# Patient Record
Sex: FEMALE | Race: BLACK OR AFRICAN AMERICAN | NOT HISPANIC OR LATINO | Employment: PART TIME | URBAN - METROPOLITAN AREA
[De-identification: names, ages, dates, MRNs, and addresses within clinical notes are randomized per-mention and may not be internally consistent; named-entity substitution may affect disease eponyms.]

---

## 2019-09-13 ENCOUNTER — OFFICE VISIT (OUTPATIENT)
Dept: ENDOCRINOLOGY | Facility: CLINIC | Age: 19
End: 2019-09-13
Payer: MEDICAID

## 2019-09-13 VITALS
HEART RATE: 82 BPM | TEMPERATURE: 100 F | SYSTOLIC BLOOD PRESSURE: 116 MMHG | HEIGHT: 66 IN | DIASTOLIC BLOOD PRESSURE: 83 MMHG | WEIGHT: 222 LBS | BODY MASS INDEX: 35.68 KG/M2

## 2019-09-13 DIAGNOSIS — E11.65 TYPE 2 DIABETES MELLITUS WITH HYPERGLYCEMIA, WITHOUT LONG-TERM CURRENT USE OF INSULIN: Primary | ICD-10-CM

## 2019-09-13 DIAGNOSIS — L83 ACANTHOSIS NIGRICANS: ICD-10-CM

## 2019-09-13 DIAGNOSIS — E66.9 OBESITY (BMI 30-39.9): ICD-10-CM

## 2019-09-13 PROCEDURE — 99999 PR PBB SHADOW E&M-NEW PATIENT-LVL III: ICD-10-PCS | Mod: PBBFAC,,, | Performed by: INTERNAL MEDICINE

## 2019-09-13 PROCEDURE — 99204 OFFICE O/P NEW MOD 45 MIN: CPT | Mod: S$PBB,,, | Performed by: INTERNAL MEDICINE

## 2019-09-13 PROCEDURE — 99999 PR PBB SHADOW E&M-NEW PATIENT-LVL III: CPT | Mod: PBBFAC,,, | Performed by: INTERNAL MEDICINE

## 2019-09-13 PROCEDURE — 99203 OFFICE O/P NEW LOW 30 MIN: CPT | Mod: PBBFAC | Performed by: INTERNAL MEDICINE

## 2019-09-13 PROCEDURE — 99204 PR OFFICE/OUTPT VISIT, NEW, LEVL IV, 45-59 MIN: ICD-10-PCS | Mod: S$PBB,,, | Performed by: INTERNAL MEDICINE

## 2019-09-13 RX ORDER — METFORMIN HYDROCHLORIDE 750 MG/1
750 TABLET, EXTENDED RELEASE ORAL
COMMUNITY
Start: 2019-01-23 | End: 2019-09-13

## 2019-09-13 RX ORDER — PEN NEEDLE, DIABETIC 30 GX3/16"
NEEDLE, DISPOSABLE MISCELLANEOUS
Qty: 150 EACH | Refills: 2 | Status: SHIPPED | OUTPATIENT
Start: 2019-09-13 | End: 2019-12-30 | Stop reason: SDUPTHER

## 2019-09-13 RX ORDER — LORATADINE 10 MG/1
TABLET ORAL
Refills: 0 | COMMUNITY
Start: 2019-06-06 | End: 2020-06-02

## 2019-09-13 RX ORDER — PREDNISONE 10 MG/1
10 TABLET ORAL 2 TIMES DAILY
Refills: 0 | COMMUNITY
Start: 2019-06-06 | End: 2020-06-02

## 2019-09-13 RX ORDER — INSULIN LISPRO 100 [IU]/ML
INJECTION, SOLUTION INTRAVENOUS; SUBCUTANEOUS
Qty: 5 SYRINGE | Refills: 1 | Status: SHIPPED | OUTPATIENT
Start: 2019-09-13 | End: 2019-09-24 | Stop reason: SDUPTHER

## 2019-09-13 RX ORDER — INSULIN PUMP SYRINGE, 3 ML
EACH MISCELLANEOUS
Qty: 1 EACH | Refills: 0 | Status: SHIPPED | OUTPATIENT
Start: 2019-09-13 | End: 2019-09-23

## 2019-09-13 NOTE — PATIENT INSTRUCTIONS
Visit an eye Dr. ( A referral is placed)    Levemir: long acting insulin  Six units daily    Humalog:Fast acting Insulin:  One unit for each 15 grams of carbohydrate with each meal.  Average of 3-4 units with the meal.    Check your blood sugar before each meal and at bedtime.  If your blood sugar is less than 105 at night, eat a snack.    Send your blood sugar log in couple days.    Keep glucose tablets with you.      -----------------------------------------------------  For Levemir:    Pregnancy Implications  Insulin detemir can be detected in cord blood.  An increased risk of fetal abnormalities has not been observed following the use of insulin detemir in pregnant females with type 1 diabetes mellitus.  Poorly controlled diabetes during pregnancy can be associated with an increased risk of adverse maternal and fetal outcomes, including diabetic ketoacidosis, preeclampsia, spontaneous ,  delivery, delivery complications, major birth defects, stillbirth, and macrosomia (ACOG 201 2018). To prevent adverse outcomes, prior to conception and throughout pregnancy, maternal blood glucose and HbA1c should be kept as close to target goals as possible but without causing significant hypoglycemia (ADA 2019; Isai 2013).

## 2019-09-13 NOTE — PROGRESS NOTES
PCP:  NONE    SELF REFERRAL    CC:  HIGH BLOOD SUGAR    HPI:  Lupe Peterson 18 y.o. female  Pt is feeling ok. She has high blood sugar, and she has found out she is pregnant.  She was treated with 5 units of Reg Insulin.   No complaints of cp, sob, vomiting or edema.  3 meals a day  Lunch is the big meal  ------------------------------  insulin regular (HumuLIN R,NovoLIN R) injection 5 Units    5 Units, Subcutaneous, Once, 1 dose, Wed 9/11/19 at 1345  --------------------------------  From [facility]ED ENDOCR[facility], DR. BLANCO'S NOTE IN 2013:  Abstract on 04/18/2013   Component Date Value Range Status    ISLET CELL AUTOANTIBODIES CF 03/28/2012 <1:2 Final    ISLET CELL IGG AUTOANTIBODIES 03/28/2012 <5 Final    ANTI BECKY 65 ANTIBODY 03/28/2012 0.00 Final    25-Hydroxy D Total 03/28/2012 <13 Final    TSH, High Sensitivity 03/28/2012 1.004 Final    Free T4 03/28/2012 0.95 Final    Insulin AutoAb 03/28/2012 0.00 Final    C PEPTIDE S 03/28/2012 1.6 Final    Hemoglobin A1C 03/28/2012 12.5* 5.7 - 6.4 % Final       History reviewed. No pertinent past medical history.    History reviewed. No pertinent surgical history.    Social History     Socioeconomic History    Marital status: Single     Spouse name: Not on file    Number of children: Not on file    Years of education: Not on file    Highest education level: Not on file   Occupational History    Not on file   Social Needs    Financial resource strain: Not on file    Food insecurity:     Worry: Not on file     Inability: Not on file    Transportation needs:     Medical: Not on file     Non-medical: Not on file   Tobacco Use    Smoking status: Former Smoker    Smokeless tobacco: Never Used   Substance and Sexual Activity    Alcohol use: Not on file    Drug use: Not on file    Sexual activity: Not on file   Lifestyle    Physical activity:     Days per week: Not on file     Minutes per session: Not on file    Stress: Not on file   Relationships    Social  "connections:     Talks on phone: Not on file     Gets together: Not on file     Attends Gnosticism service: Not on file     Active member of club or organization: Not on file     Attends meetings of clubs or organizations: Not on file     Relationship status: Not on file   Other Topics Concern    Not on file   Social History Narrative    Not on file         ROS:   Pregnancy  High blood sugar  Diabetes  ROS otherwise neg except for what is mentioned in the PMH, PSH and HPI    PE:  Vitals:    09/13/19 1030   BP: 116/83   Pulse: 82   Temp: 99.5 °F (37.5 °C)     Alert and oriented  No acute distress  No acne  Acanthosis  No Proptosis or conjunctivitis  No rash on tongue, + teeth  No goitre by inspection  Thyroid gland is not palpable  No cervical lymphadenopathy  Heart reg, no gallop  Lungs cta, no wheezing  Abd soft, no tnd  No edema in lower legs  No rash  No bruises  Speech normal  Behavior normal  No tremor  + obesity  Body mass index is 35.83 kg/m².      Lab:      A/P:  Type 2 diabetes mellitus with hyperglycemia, without long-term current use of insulin  -     Ambulatory Referral to Ophthalmology    Obesity (BMI 30-39.9)    Acanthosis nigricans    Other orders  -     blood sugar diagnostic Strp; 5-6 times a day  Dispense: 200 strip; Refill: 2  -     blood-glucose meter kit; Use as instructed 5-7 times a day  Dispense: 1 each; Refill: 0  -     insulin detemir U-100 (LEVEMIR FLEXTOUCH U-100 INSULN) 100 unit/mL (3 mL) SubQ InPn pen; 6 units daily  Dispense: 3 mL; Refill: 0  -     insulin lispro (HUMALOG KWIKPEN INSULIN) 100 unit/mL pen; One unit for each 15 grams of carb with each meal; average of 3 units each meal  Dispense: 5 Syringe; Refill: 1  -     pen needle, diabetic 30 gauge x 5/16" Ndle; qid  Dispense: 150 each; Refill: 2       Patient Instructions   Visit an eye Dr. ( A referral is placed)    Levemir: long acting insulin  Six units daily    Humalog:Fast acting Insulin:  One unit for each 15 grams of " carbohydrate with each meal.  Average of 3-4 units with the meal.    Check your blood sugar before each meal and at bedtime.  If your blood sugar is less than 105 at night, eat a snack.    Send your blood sugar log in couple days.    Keep glucose tablets with you.      -----------------------------------------------------  For Levemir:    Pregnancy Implications  Insulin detemir can be detected in cord blood.  An increased risk of fetal abnormalities has not been observed following the use of insulin detemir in pregnant females with type 1 diabetes mellitus.  Poorly controlled diabetes during pregnancy can be associated with an increased risk of adverse maternal and fetal outcomes, including diabetic ketoacidosis, preeclampsia, spontaneous ,  delivery, delivery complications, major birth defects, stillbirth, and macrosomia (ACOG 201 2018). To prevent adverse outcomes, prior to conception and throughout pregnancy, maternal blood glucose and HbA1c should be kept as close to target goals as possible but without causing significant hypoglycemia (ADA 2019; Isai 2013).      Appt in one week.      Pt understands the plan and instructions.

## 2019-09-23 ENCOUNTER — OFFICE VISIT (OUTPATIENT)
Dept: ENDOCRINOLOGY | Facility: CLINIC | Age: 19
End: 2019-09-23
Payer: MEDICAID

## 2019-09-23 VITALS
HEART RATE: 62 BPM | SYSTOLIC BLOOD PRESSURE: 108 MMHG | BODY MASS INDEX: 35.19 KG/M2 | HEIGHT: 66 IN | DIASTOLIC BLOOD PRESSURE: 68 MMHG | WEIGHT: 218.94 LBS | TEMPERATURE: 98 F

## 2019-09-23 DIAGNOSIS — E11.65 TYPE 2 DIABETES MELLITUS WITH HYPERGLYCEMIA, WITHOUT LONG-TERM CURRENT USE OF INSULIN: Primary | ICD-10-CM

## 2019-09-23 DIAGNOSIS — E66.9 OBESITY (BMI 30-39.9): ICD-10-CM

## 2019-09-23 PROCEDURE — 99213 OFFICE O/P EST LOW 20 MIN: CPT | Mod: PBBFAC | Performed by: INTERNAL MEDICINE

## 2019-09-23 PROCEDURE — 99214 OFFICE O/P EST MOD 30 MIN: CPT | Mod: S$PBB,,, | Performed by: INTERNAL MEDICINE

## 2019-09-23 PROCEDURE — 99999 PR PBB SHADOW E&M-EST. PATIENT-LVL III: ICD-10-PCS | Mod: PBBFAC,,, | Performed by: INTERNAL MEDICINE

## 2019-09-23 PROCEDURE — 99999 PR PBB SHADOW E&M-EST. PATIENT-LVL III: CPT | Mod: PBBFAC,,, | Performed by: INTERNAL MEDICINE

## 2019-09-23 PROCEDURE — 99214 PR OFFICE/OUTPT VISIT, EST, LEVL IV, 30-39 MIN: ICD-10-PCS | Mod: S$PBB,,, | Performed by: INTERNAL MEDICINE

## 2019-09-23 RX ORDER — BLOOD-GLUCOSE METER
EACH MISCELLANEOUS
Refills: 0 | COMMUNITY
Start: 2019-09-13

## 2019-09-23 NOTE — PROGRESS NOTES
PCP:  NONE    SELF REFERRAL    CC:  HIGH BLOOD SUGAR    HPI:  Lupe Peterson 18 y.o. female  Patient is accompanied by a young man.  Patient had miscarriage few days ago on 9/17/19  DM at age 11 y  On Metfomin BID  153 this am  Last night 206  Mostly 200s  3 meals a day  Lunch is the big meal  No complaints of fever, chest pain, shortness breath, nausea, vomiting, edema, or rash.  ------------------------------  From [facility]ED ENDOCRINE, DR. BLANCO'S NOTE IN 2013:  Abstract on 04/18/2013   Component Date Value Range Status    ISLET CELL AUTOANTIBODIES CF 03/28/2012 <1:2 Final    ISLET CELL IGG AUTOANTIBODIES 03/28/2012 <5 Final    ANTI BECKY 65 ANTIBODY 03/28/2012 0.00 Final    25-Hydroxy D Total 03/28/2012 <13 Final    TSH, High Sensitivity 03/28/2012 1.004 Final    Free T4 03/28/2012 0.95 Final    Insulin AutoAb 03/28/2012 0.00 Final    C PEPTIDE S 03/28/2012 1.6 Final    Hemoglobin A1C 03/28/2012 12.5* 5.7 - 6.4 % Final       Past Medical History:   Diagnosis Date    Type 2 diabetes mellitus with hyperglycemia, without long-term current use of insulin 9/13/2019       No past surgical history on file.    Social History     Socioeconomic History    Marital status: Single     Spouse name: Not on file    Number of children: Not on file    Years of education: Not on file    Highest education level: Not on file   Occupational History    Not on file   Social Needs    Financial resource strain: Not on file    Food insecurity:     Worry: Not on file     Inability: Not on file    Transportation needs:     Medical: Not on file     Non-medical: Not on file   Tobacco Use    Smoking status: Former Smoker    Smokeless tobacco: Never Used   Substance and Sexual Activity    Alcohol use: Not on file    Drug use: Not on file    Sexual activity: Not on file   Lifestyle    Physical activity:     Days per week: Not on file     Minutes per session: Not on file    Stress: Not on file   Relationships    Social  connections:     Talks on phone: Not on file     Gets together: Not on file     Attends Evangelical service: Not on file     Active member of club or organization: Not on file     Attends meetings of clubs or organizations: Not on file     Relationship status: Not on file   Other Topics Concern    Not on file   Social History Narrative    Not on file         ROS:   Diabetes  ROS otherwise neg except for what is mentioned in the PMH, PSH and HPI    PE:  Vitals:    09/23/19 1501   BP: 108/68   Pulse: 62   Temp: 98.2 °F (36.8 °C)     Alert and oriented  No acute distress  No acne  Acanthosis nigricans  No Proptosis or conjunctivitis  No rash on tongue, + teeth  No goitre by inspection  Thyroid gland is not palpable  No cervical lymphadenopathy  Heart reg, no gallop  Lungs cta, no wheezing  Abd soft, no tnd  No edema in lower legs  No rash  No bruises  Speech normal  Behavior normal  No tremor  + obesity  Body mass index is 35.33 kg/m².      Lab:      A/P:  Type 2 diabetes mellitus with hyperglycemia, without long-term current use of insulin  Status post miscarriage  -     Ambulatory Referral to Ophthalmology    Obesity (BMI 30-39.9)    Acanthosis nigricans      Patient Instructions       Levemir every morning  15 units daily  --------------------------------------------------------------  Humalog:Fast acting Insulin:  12 units at lunch  and 8 units at supper    Check your blood sugar before each meal and at bedtime.  If your blood sugar is less than 105 at night, eat a snack.    Send your blood sugar log in couple days.    Keep glucose tablets with you.      -----------------------------------------------------         Send your blood sugar log in couple days.    Keep glucose tablets with you.      Appt in 6 weeks      Pt understands the plan and instructions.

## 2019-09-23 NOTE — PATIENT INSTRUCTIONS
Levemir every morning  15 units daily  --------------------------------------------------------------  Humalog:Fast acting Insulin:  12 units at lunch  and 8 units at supper    Check your blood sugar before each meal and at bedtime.  If your blood sugar is less than 105 at night, eat a snack.    Send your blood sugar log in couple days.    Keep glucose tablets with you.      -----------------------------------------------------

## 2019-09-24 ENCOUNTER — PATIENT MESSAGE (OUTPATIENT)
Dept: ENDOCRINOLOGY | Facility: CLINIC | Age: 19
End: 2019-09-24

## 2019-09-25 ENCOUNTER — PATIENT MESSAGE (OUTPATIENT)
Dept: ENDOCRINOLOGY | Facility: CLINIC | Age: 19
End: 2019-09-25

## 2019-09-25 RX ORDER — INSULIN LISPRO 100 [IU]/ML
INJECTION, SOLUTION INTRAVENOUS; SUBCUTANEOUS
Qty: 5 SYRINGE | Refills: 1 | Status: SHIPPED | OUTPATIENT
Start: 2019-09-25 | End: 2019-10-01 | Stop reason: SDUPTHER

## 2019-09-26 ENCOUNTER — PATIENT MESSAGE (OUTPATIENT)
Dept: ENDOCRINOLOGY | Facility: CLINIC | Age: 19
End: 2019-09-26

## 2019-10-01 ENCOUNTER — PATIENT MESSAGE (OUTPATIENT)
Dept: ENDOCRINOLOGY | Facility: CLINIC | Age: 19
End: 2019-10-01

## 2019-10-01 RX ORDER — INSULIN LISPRO 100 [IU]/ML
INJECTION, SOLUTION INTRAVENOUS; SUBCUTANEOUS
Qty: 5 SYRINGE | Refills: 1 | Status: SHIPPED | OUTPATIENT
Start: 2019-10-01 | End: 2019-12-30 | Stop reason: SDUPTHER

## 2019-10-04 ENCOUNTER — PATIENT MESSAGE (OUTPATIENT)
Dept: ENDOCRINOLOGY | Facility: CLINIC | Age: 19
End: 2019-10-04

## 2019-10-06 ENCOUNTER — PATIENT MESSAGE (OUTPATIENT)
Dept: ENDOCRINOLOGY | Facility: CLINIC | Age: 19
End: 2019-10-06

## 2019-10-08 ENCOUNTER — PATIENT MESSAGE (OUTPATIENT)
Dept: ENDOCRINOLOGY | Facility: CLINIC | Age: 19
End: 2019-10-08

## 2019-10-08 ENCOUNTER — TELEPHONE (OUTPATIENT)
Dept: ENDOCRINOLOGY | Facility: CLINIC | Age: 19
End: 2019-10-08

## 2019-10-25 ENCOUNTER — PATIENT MESSAGE (OUTPATIENT)
Dept: ENDOCRINOLOGY | Facility: CLINIC | Age: 19
End: 2019-10-25

## 2019-10-31 ENCOUNTER — PATIENT MESSAGE (OUTPATIENT)
Dept: ENDOCRINOLOGY | Facility: CLINIC | Age: 19
End: 2019-10-31

## 2019-11-05 ENCOUNTER — OFFICE VISIT (OUTPATIENT)
Dept: ENDOCRINOLOGY | Facility: CLINIC | Age: 19
End: 2019-11-05
Payer: MEDICAID

## 2019-11-05 VITALS
HEART RATE: 102 BPM | SYSTOLIC BLOOD PRESSURE: 125 MMHG | HEIGHT: 66 IN | TEMPERATURE: 99 F | DIASTOLIC BLOOD PRESSURE: 75 MMHG | BODY MASS INDEX: 35.79 KG/M2 | WEIGHT: 222.69 LBS

## 2019-11-05 DIAGNOSIS — Z79.4 LONG-TERM INSULIN USE: ICD-10-CM

## 2019-11-05 DIAGNOSIS — E11.65 UNCONTROLLED TYPE 2 DIABETES MELLITUS WITH HYPERGLYCEMIA: Primary | ICD-10-CM

## 2019-11-05 DIAGNOSIS — E66.9 OBESITY (BMI 30-39.9): ICD-10-CM

## 2019-11-05 PROCEDURE — 99213 OFFICE O/P EST LOW 20 MIN: CPT | Mod: PBBFAC | Performed by: INTERNAL MEDICINE

## 2019-11-05 PROCEDURE — 99214 OFFICE O/P EST MOD 30 MIN: CPT | Mod: S$PBB,,, | Performed by: INTERNAL MEDICINE

## 2019-11-05 PROCEDURE — 99214 PR OFFICE/OUTPT VISIT, EST, LEVL IV, 30-39 MIN: ICD-10-PCS | Mod: S$PBB,,, | Performed by: INTERNAL MEDICINE

## 2019-11-05 PROCEDURE — 99999 PR PBB SHADOW E&M-EST. PATIENT-LVL III: ICD-10-PCS | Mod: PBBFAC,,, | Performed by: INTERNAL MEDICINE

## 2019-11-05 PROCEDURE — 99999 PR PBB SHADOW E&M-EST. PATIENT-LVL III: CPT | Mod: PBBFAC,,, | Performed by: INTERNAL MEDICINE

## 2019-11-05 NOTE — PATIENT INSTRUCTIONS
Levemir every morning  20 units daily  --------------------------------------------------------------  Humalog:Fast acting Insulin:  24 units at lunch  and 24 units at supper     Check your blood sugar before each meal and at bedtime.  If your blood sugar is less than 105 at night, eat a snack.    Send your blood sugar log in couple days.    Keep glucose tablets with you.    If you get pregnant stop taking Metformin.    -----------------------------------------------------

## 2019-11-05 NOTE — PROGRESS NOTES
PCP:  NONE    SELF REFERRAL    CC:  HIGH BLOOD SUGAR    HPI:  Lupe Peterson 18 y.o. female  Patient is accompanied by a young man. Patient had miscarriage few days ago on 9/17/19  DM at age 11 y  On Metfomin BID  On Humalog.  The dose was increased gradually.  The current dose is 20 units with lunch and 20 units with supper.  On Levemir 15 units q.a.m..  CBGs have improved.  No hypoglycemia occurred.  3 meals a day  Lunch is the big meal  No complaints of fever, chest pain, shortness breath, nausea, vomiting, edema, or rash.  Not on Prednisone  ------------------------------  From [facility]ED ENDOCR[facility], DR. BLANCO'S NOTE IN 2013:  Abstract on 04/18/2013   Component Date Value Range Status    ISLET CELL AUTOANTIBODIES CF 03/28/2012 <1:2 Final    ISLET CELL IGG AUTOANTIBODIES 03/28/2012 <5 Final    ANTI BECKY 65 ANTIBODY 03/28/2012 0.00 Final    25-Hydroxy D Total 03/28/2012 <13 Final    TSH, High Sensitivity 03/28/2012 1.004 Final    Free T4 03/28/2012 0.95 Final    Insulin AutoAb 03/28/2012 0.00 Final    C PEPTIDE S 03/28/2012 1.6 Final    Hemoglobin A1C 03/28/2012 12.5* 5.7 - 6.4 % Final       Past Medical History:   Diagnosis Date    Type 2 diabetes mellitus with hyperglycemia, without long-term current use of insulin 9/13/2019       History reviewed. No pertinent surgical history.    Social History     Socioeconomic History    Marital status: Single     Spouse name: Not on file    Number of children: Not on file    Years of education: Not on file    Highest education level: Not on file   Occupational History    Not on file   Social Needs    Financial resource strain: Not on file    Food insecurity:     Worry: Not on file     Inability: Not on file    Transportation needs:     Medical: Not on file     Non-medical: Not on file   Tobacco Use    Smoking status: Former Smoker    Smokeless tobacco: Never Used   Substance and Sexual Activity    Alcohol use: Not on file    Drug use: Not on file     Sexual activity: Not on file   Lifestyle    Physical activity:     Days per week: Not on file     Minutes per session: Not on file    Stress: Not on file   Relationships    Social connections:     Talks on phone: Not on file     Gets together: Not on file     Attends Holiness service: Not on file     Active member of club or organization: Not on file     Attends meetings of clubs or organizations: Not on file     Relationship status: Not on file   Other Topics Concern    Not on file   Social History Narrative    Not on file         ROS:   Diabetes  LMP was last month on the 4th , only x 1 day  Not on BCP  ROS otherwise neg except for what is mentioned in the PMH, PSH and HPI    PE:  Vitals:    11/05/19 1345   BP: 125/75   Pulse: 102   Temp: 98.5 °F (36.9 °C)     Alert and oriented  No acute distress  No acne  Acanthosis nigricans  No goitre by inspection  Thyroid gland is not palpable  No cervical lymphadenopathy  Heart reg, no gallop  Lungs cta, no wheezing  Abd soft, no tnd  No edema in lower legs  No rash  Speech normal  Behavior normal  No tremor  + obesity  Body mass index is 35.94 kg/m².      Lab:      A/P:  Type 2 diabetes mellitus with hyperglycemia, with long-term current use of insulin  To continue taking metformin twice a day  Levemir dose to be increased  Humalog dose to be increased  Status post miscarriage  -     Ambulatory Referral to Ophthalmology  Patient is advised today to make appointment to see the eye doctor.    Obesity (BMI 30-39.9)    Acanthosis nigricans    Patient Instructions discussed.     Levemir every morning  20 units daily  --------------------------------------------------------------  Humalog:Fast acting Insulin:  24 units at lunch  and 24 units at supper     Check your blood sugar before each meal and at bedtime.  If your blood sugar is less than 105 at night, eat a snack.    Send your blood sugar log in couple days.    Keep glucose tablets with you.    If you get pregnant  stop taking Metformin.    -----------------------------------------------------       Appt in 3 months.      Pt understands the plan and instructions.

## 2019-11-06 ENCOUNTER — PATIENT MESSAGE (OUTPATIENT)
Dept: ENDOCRINOLOGY | Facility: CLINIC | Age: 19
End: 2019-11-06

## 2019-12-11 ENCOUNTER — PATIENT MESSAGE (OUTPATIENT)
Dept: ENDOCRINOLOGY | Facility: CLINIC | Age: 19
End: 2019-12-11

## 2019-12-28 ENCOUNTER — PATIENT MESSAGE (OUTPATIENT)
Dept: ENDOCRINOLOGY | Facility: CLINIC | Age: 19
End: 2019-12-28

## 2019-12-31 RX ORDER — INSULIN LISPRO 100 [IU]/ML
20 INJECTION, SOLUTION INTRAVENOUS; SUBCUTANEOUS
Qty: 15 ML | Refills: 0 | Status: SHIPPED | OUTPATIENT
Start: 2019-12-31

## 2019-12-31 RX ORDER — PEN NEEDLE, DIABETIC 30 GX3/16"
NEEDLE, DISPOSABLE MISCELLANEOUS
Qty: 150 EACH | Refills: 2 | Status: SHIPPED | OUTPATIENT
Start: 2019-12-31

## 2020-01-01 ENCOUNTER — PATIENT MESSAGE (OUTPATIENT)
Dept: ENDOCRINOLOGY | Facility: CLINIC | Age: 20
End: 2020-01-01

## 2020-06-02 ENCOUNTER — HOSPITAL ENCOUNTER (OUTPATIENT)
Facility: HOSPITAL | Age: 20
Discharge: HOME | End: 2020-06-02
Attending: EMERGENCY MEDICINE
Payer: MEDICAID

## 2020-06-02 VITALS
SYSTOLIC BLOOD PRESSURE: 146 MMHG | TEMPERATURE: 99 F | DIASTOLIC BLOOD PRESSURE: 75 MMHG | BODY MASS INDEX: 36.96 KG/M2 | WEIGHT: 230 LBS | HEIGHT: 66 IN | RESPIRATION RATE: 20 BRPM | OXYGEN SATURATION: 100 % | HEART RATE: 75 BPM

## 2020-06-02 DIAGNOSIS — S01.81XA FACIAL LACERATION, INITIAL ENCOUNTER: ICD-10-CM
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
## 2020-06-02 DIAGNOSIS — M79.604 RIGHT LEG PAIN: ICD-10-CM

## 2020-06-02 LAB — SARS-COV-2 RDRP RESP QL NAA+PROBE: NEGATIVE

## 2020-06-02 PROCEDURE — 12013 RPR F/E/E/N/L/M 2.6-5.0 CM: CPT

## 2020-06-02 PROCEDURE — G0378 HOSPITAL OBSERVATION PER HR: HCPCS

## 2020-06-02 PROCEDURE — U0002 COVID-19 LAB TEST NON-CDC: HCPCS

## 2020-06-02 PROCEDURE — 25000003 PHARM REV CODE 250: Performed by: REGISTERED NURSE

## 2020-06-02 PROCEDURE — 99283 EMERGENCY DEPT VISIT LOW MDM: CPT | Mod: 25

## 2020-06-02 RX ORDER — METFORMIN HYDROCHLORIDE 500 MG/1
500 TABLET ORAL 2 TIMES DAILY WITH MEALS
COMMUNITY

## 2020-06-02 RX ORDER — ACETAMINOPHEN 325 MG/1
650 TABLET ORAL
Status: COMPLETED | OUTPATIENT
Start: 2020-06-02 | End: 2020-06-02

## 2020-06-02 RX ADMIN — Medication 1 ML: at 06:06

## 2020-06-02 RX ADMIN — ACETAMINOPHEN 650 MG: 325 TABLET ORAL at 06:06
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
## 2020-06-03 NOTE — ED NOTES
Unsuccessful attempt at FHT. Ultrasound at bedside with POONAM Maldonado. Strong fetal movement and strong fetal heart beat seen.